# Patient Record
Sex: FEMALE | Race: WHITE | NOT HISPANIC OR LATINO | Employment: UNEMPLOYED | URBAN - METROPOLITAN AREA
[De-identification: names, ages, dates, MRNs, and addresses within clinical notes are randomized per-mention and may not be internally consistent; named-entity substitution may affect disease eponyms.]

---

## 2020-08-03 ENCOUNTER — HOSPITAL ENCOUNTER (OUTPATIENT)
Dept: PREADMISSION TESTING | Facility: HOSPITAL | Age: 20
Discharge: HOME | End: 2020-08-03
Attending: PODIATRIST

## 2020-08-03 ENCOUNTER — OFFICE VISIT CONVERTED (OUTPATIENT)
Dept: PODIATRY | Facility: CLINIC | Age: 20
End: 2020-08-03
Attending: PODIATRIST

## 2020-08-04 LAB — SARS-COV-2 RNA SPEC QL NAA+PROBE: NOT DETECTED

## 2020-08-07 ENCOUNTER — HOSPITAL ENCOUNTER (OUTPATIENT)
Dept: PERIOP | Facility: HOSPITAL | Age: 20
Setting detail: HOSPITAL OUTPATIENT SURGERY
Discharge: HOME | End: 2020-08-07
Attending: PODIATRIST

## 2020-08-07 LAB — HCG UR QL: NEGATIVE

## 2020-08-12 ENCOUNTER — OFFICE VISIT CONVERTED (OUTPATIENT)
Dept: PODIATRY | Facility: CLINIC | Age: 20
End: 2020-08-12
Attending: PODIATRIST

## 2020-08-19 ENCOUNTER — OFFICE VISIT CONVERTED (OUTPATIENT)
Dept: PODIATRY | Facility: CLINIC | Age: 20
End: 2020-08-19
Attending: PODIATRIST

## 2020-08-26 ENCOUNTER — OFFICE VISIT CONVERTED (OUTPATIENT)
Dept: PODIATRY | Facility: CLINIC | Age: 20
End: 2020-08-26
Attending: PODIATRIST

## 2020-09-10 ENCOUNTER — OFFICE VISIT CONVERTED (OUTPATIENT)
Dept: PODIATRY | Facility: CLINIC | Age: 20
End: 2020-09-10
Attending: PODIATRIST

## 2020-10-06 ENCOUNTER — CONVERSION ENCOUNTER (OUTPATIENT)
Dept: PODIATRY | Facility: CLINIC | Age: 20
End: 2020-10-06

## 2020-10-06 ENCOUNTER — OFFICE VISIT CONVERTED (OUTPATIENT)
Dept: PODIATRY | Facility: CLINIC | Age: 20
End: 2020-10-06
Attending: PODIATRIST

## 2021-05-10 NOTE — H&P
History and Physical      Patient Name: Concepcion Villarreal   Patient ID: [id number]   Sex: Female   YOB: 2000    Primary Care Provider: No PCP No PCP Other   Referring Provider: Ivan Marcano DPM    Visit Date: August 3, 2020    Provider: Ivan Marcano DPM   Location: [facility]   Location Address: [address] [place] [address]  [id number]   Location Phone: [phone]          Chief Complaint  · Right Foot Pain  · Surgical History and Physical      History Of Present Illness  Concepcion Villarreal is a 19 year old /White female who presents to the [facility] today new patient referred from [facility] emergency room.   Patient Name: Concepcion Villarreal   Allergies: NO KNOWN DRUG ALLERGIES   Cheif Complaint/HPI: Fractured right fifth metatarsal shaft   Current Medicaitons: MEDICATION LIST   Past Medical History: Foot pain, Fracture of foot, and Hypertension   Relevent Social History: None   Tobacco Use: Does not smoke   Psychological History: Within Normal Limits   HPI     New, Established, New Problem: New  Location: Right fifth metatarsal shaft  Duration:  6 days ago  Onset: Trauma, twisted her foot  Nature: Sore achy  Stable, worsening, improving: Stable, no improvement  Aggravating factors:   Any weightbearing  Previous Treatment: X-rays posterior splint cast, crutches    Recommend ORIF of right fifth metatarsal shaft.  Upon discussion of non-surgical conservative option, surgical correction, post-operative requirements along with risk and benefits of the surgery along with expected outcomes, the patient states they would like to proceed with the scheduling surgery.    Patient denies any fevers, chills, nausea, vomiting, shortness of breathe, nor any other constitutional signs nor symptoms.       Past Medical History  Foot pain; Fracture of foot; Hypertension         Past Surgical History  *Denies any surgical procedures  "        Allergy List  NO KNOWN DRUG ALLERGIES       Allergies Reconciled  Family Medical History  *No Known Family History         Social History  Alcohol (Never); Tobacco (Current every day)         Review of Systems  · Constitutional  o Denies  o : fatigue, night sweats  · Eyes  o Denies  o : double vision, blurred vision  · HENT  o Denies  o : vertigo, recent head injury  · Cardiovascular  o Denies  o : chest pain, irregular heart beats  · Respiratory  o Denies  o : shortness of breath, productive cough  · Gastrointestinal  o Denies  o : nausea, vomiting  · Genitourinary  o Denies  o : dysuria, urinary retention  · Integument  o * See HPI  · Neurologic  o Denies  o : altered mental status, seizures  · Musculoskeletal  o Denies  o : joint swelling, limitation of motion  · Endocrine  o Denies  o : cold intolerance, heat intolerance  · Heme-Lymph  o Denies  o : petechiae, lymph node enlargement or tenderness  · Allergic-Immunologic  o Denies  o : frequent illnesses      Vitals  Date Time BP Position Site L\R Cuff Size HR RR TEMP (F) WT  HT  BMI kg/m2 BSA m2 O2 Sat HC       08/03/2020 01:23 /94 Sitting    108 - R  97.5 280lbs 0oz 5'  8\" 42.57 2.47 97 %    08/03/2020 01:23 /87 Sitting                     Physical Examination  · Constitutional  o Appearance  o : morbidly obese, well developed  · Cardiovascular  o Peripheral Vascular System  o :   § Pedal Pulses  § : pulses 2 + and symmetrical  § Extremities  § : no edema in lower extremities  · Musculoskeletal  o General  o :   § General Musculoskeletal  § : Lower extremity muscle and strength and range of motion is equal and symmetrical bilaterally. The knees are noted to be normal in alignment. Ankle alignment and range of motion is notmral and foot structure is normal. Subtalar, metatarsal and metatarsal-phalangeal range of motion is noted to be within normal limits. The digits of both feet are in normal alignment. The gait is normal. Tenderness to " palpation of the base of the fifth metatarsal shaft on the right foot. Local edema with ecchymosis is seen. No breaks in skin.  · Skin and Subcutaneous Tissue  o General Inspection  o : Skin is noted to have normal texture and turgor, with no excrescences noted.   o Digits and Nails  o : The toenails are noted to be without disese.  · Neurologic  o Sensation  o : Epicritic sensations intact bilaterally.     Dr. Marcano reviewed radiographs and results from [facility] and discussed them with the patient.  These are significant for displaced fracture of the base of the right fifth metatarsal shaft.           Assessment  · Foot pain, right     729.5/M79.671  · Fractured metatarsal bone     825.25/S92.309A  · Preoperative examination     V72.84/Z01.818      Plan  · Orders  o OH Report (KASPR) - - 08/03/2020  o Insertion of internal fixation device into right metatarsal, open approach (38173) - - 08/03/2020  o Application of short leg cast (04326) - - 08/03/2020  o [facility] Pre-Op Covid-19 Screening (97417) - - 08/03/2020  · Medications  o Medications have been Reconciled  o Transition of Care or Provider Policy  · Instructions  o PLAN: ORIF of right fifth metatarsal shaft  o Handouts Provided-Pre-Procedure Instructions including date and time and location of procedure.  o Surgical Facility: [facility]  o ****Surgical Orders****  o ****Patient Status****  o Outpatient  o ********************  o RISK AND BENEFITS:  o Consent for surgery: Given these options, the patient has verbally expressed an understanding of the risks of surgery and finds these risks acceptable. We will proceed with surgery as soon as possible.  o Consult Anesthesia for an post-operative block, or any pain management procedure deemed necessary by the anestesiologist for adequate post-operative pain control.   o O.R. PREP: Per protocol  o IV: Per Anesthesia  o IV: LR@ 75ml/hr  o PLEASE SIGN PERMIT FOR: Fraction of fracture  and right foot.  o *******************************************  o PRE- OP MEDICATION ORDER:  o *******************************************  o *__Kefzol 2 gram IV on call to OR.  o *___The above History and Physical Examination has been completed within 30 days of admission.  o Pre-Admission Testing Date:   o Follow up post surgery in 4 days.  o Local with MAC, supine, [place] 28 hardware set.  o Electronically Identified Patient Education Materials Provided Electronically  · Disposition  o Call or Return if symptoms worsen or persist.            Electronically Signed by: Ivan Marcano DPM -Author on August 3, 2020 01:45:27 PM

## 2021-05-13 NOTE — PROGRESS NOTES
Progress Note      Patient Name: Concepcion Villarreal   Patient ID: [id number]   Sex: Female   YOB: 2000    Primary Care Provider: No PCP No PCP Other   Referring Provider: Ivan Marcano DPM    Visit Date: August 12, 2020    Provider: Ivan Marcano DPM   Location: [facility]   Location Address: [address] [place] [address]  [id number]   Location Phone: [phone]          Chief Complaint  · Follow Up Office Visit S/P Surgery      History Of Present Illness  Concepcion Villarreal is a 19 year old /White female who presents today for a postoperative visit.      Procedure: Right fifth metatarsal ORIF fracture  Date: 7 August 2020    Patient states they are doing well without complications.  Patient states they are following post-op instructions.  Patient states pain is controlled.      Patient denies any fevers, chills, nausea, vomiting, shortness of breathe, nor any other constitutional signs nor symptoms.                 Past Medical History  Foot pain; Fracture of foot; Hypertension         Past Surgical History  *Denies any surgical procedures         Allergy List  NO KNOWN DRUG ALLERGIES         Family Medical History  *No Known Family History         Social History  Alcohol (Never); Tobacco (Current every day)         Review of Systems  · Constitutional  o Denies  o : fatigue, night sweats  · Eyes  o Denies  o : double vision, blurred vision  · HENT  o Denies  o : vertigo, recent head injury  · Cardiovascular  o Denies  o : chest pain, irregular heart beats  · Respiratory  o Denies  o : shortness of breath, productive cough  · Gastrointestinal  o Denies  o : nausea, vomiting  · Genitourinary  o Denies  o : dysuria, urinary retention  · Integument  o * See HPI  · Neurologic  o Denies  o : altered mental status, seizures  · Musculoskeletal  o * See HPI  · Endocrine  o Denies  o : cold intolerance, heat intolerance  · Heme-Lymph  o Denies  o :  petechiae, lymph node enlargement or tenderness  · Allergic-Immunologic  o Denies  o : frequent illnesses      Physical Examination  · Constitutional  o Appearance  o : well developed, well-nourished, no obvious deformities present  · Cardiovascular  o Peripheral Vascular System  o :   § Pedal Pulses  § : pulses 2 + and symmetrical  § Extremities  § : no edema in lower extremities  · Skin and Subcutaneous Tissue  o General Inspection  o : Skin is noted to have normal texture and turgor, with no excrescences noted.   o Digits and Nails  o : The toenails are noted to be without disese.  · Neurologic  o Sensation  o : Epicritic sensations intact bilaterally.  · Right Ankle/Foot  o Inspection  o : Posterior splint cast and dressing are dry and intact without signs of breakthrough. Sutures intact with skin edges well-coapted with no signs of dehiscence. Healthy surgical skin edges. No drainage present. No edema, erythema, calor, lymphangitis, nor signs of infection seen.  · In Office Procedures  o View  o : AP/LATERAL/OBLIQUE   o Site  o : right, foot   o Indication  o : Right foot postop  o Study  o : X-rays ordered, taken in the office, and reviewed today.  o Xray  o : ORIF in original postoperative position with good reduction of the fracture fragment the base of the right fifth metatarsal shaft.  o Comparative Data  o : No other changes seen compared to previous views.   · Casting  o Extremity  o : right leg, Short leg cast.    o Procedure  o : Patient was placed in fiberglass cast today. The patient tolerated the procedure without any complications., Neurovascular status was evaluated post cast application and was within normal limits. The cast was not causing impingement on neuro-vasculature or vital structures.          Assessment  · Postoperative Exam Following Surgery     V67.00      Plan  · Orders  o Casting Supplies (Short Leg) Adult () - - 08/12/2020  o Xray foot right [facility] Preferred View (15229-[id number]) - -  08/12/2020  o Application of short leg cast (85831) - - 08/12/2020  · Instructions  o Follow up in 2 weeks. X-rays, second cast application  o Patient tending nonweightbearing and the cast dry and intact. The patient states understanding and agreement with this plan.   o Electronically Identified Patient Education Materials Provided Electronically  · Disposition  o Call or Return if symptoms worsen or persist.            Electronically Signed by: Ivan Marcano DPM -Author on August 12, 2020 08:50:09 AM

## 2021-05-13 NOTE — PROGRESS NOTES
Progress Note      Patient Name: Concepcion Villarreal   Patient ID: [id number]   Sex: Female   YOB: 2000    Primary Care Provider: No PCP No PCP Other   Referring Provider: Ivan Marcano DPM    Visit Date: September 10, 2020    Provider: Ivan Marcano DPM   Location: [facility]   Location Address: [address] [place] [address]  [id number]   Location Phone: [phone]          Chief Complaint  · Follow Up Office Visit S/P Surgery      History Of Present Illness  Concepcion Villarreal is a 20 year old /White female who presents today for a postoperative visit.      Procedure: Right fifth metatarsal ORIF fracture  Date: 7 August 2020    Patient relates no medical changes since their last visit.    Patient denies any fevers, chills, nausea, vomiting, shortness of breathe, nor any other constitutional signs nor symptoms.      The patient states she has been compliant with nonweightbearing to the right foot.               Past Medical History  Aftercare following surgery; Foot pain; Foot pain, right; Fracture of foot; Hypertension         Past Surgical History  *Denies any surgical procedures         Allergy List  NO KNOWN DRUG ALLERGIES       Allergies Reconciled  Family Medical History  *No Known Family History         Social History  Alcohol (Never); Tobacco (Current every day)         Review of Systems  · Constitutional  o Denies  o : fatigue, night sweats  · Eyes  o Denies  o : double vision, blurred vision  · HENT  o Denies  o : vertigo, recent head injury  · Cardiovascular  o Denies  o : chest pain, irregular heart beats  · Respiratory  o Denies  o : shortness of breath, productive cough  · Gastrointestinal  o Denies  o : nausea, vomiting  · Genitourinary  o Denies  o : dysuria, urinary retention  · Integument  o * See HPI  · Neurologic  o Denies  o : altered mental status, seizures  · Musculoskeletal  o * See HPI  · Endocrine  o Denies  o : cold intolerance, heat  intolerance  · Heme-Lymph  o Denies  o : petechiae, lymph node enlargement or tenderness  · Allergic-Immunologic  o Denies  o : frequent illnesses      Physical Examination  · Constitutional  o Appearance  o : well developed, well-nourished, no obvious deformities present  · Cardiovascular  o Peripheral Vascular System  o :   § Pedal Pulses  § : pulses 2 + and symmetrical  § Extremities  § : no edema in lower extremities  · Skin and Subcutaneous Tissue  o General Inspection  o : Skin is noted to have normal texture and turgor, with no excrescences noted.   o Digits and Nails  o : The toenails are noted to be without disese.  · Neurologic  o Sensation  o : Epicritic sensations intact bilaterally.  · Right Ankle/Foot  o Inspection  o : Below-knee fiberglass cast is intact with the dressing and cast padding are dry and intact. Skin edges well-coapted with no signs of dehiscence. No hypertrophic scar formation.  · In Office Procedures  o View  o : AP/LATERAL/OBLIQUE   o Site  o : right, foot   o Indication  o : Right foot postop  o Study  o : X-rays ordered, taken in the office, and reviewed today.  o Xray  o : ORIF in original postoperative position with good reduction of the fracture fragment the base of the right fifth metatarsal shaft. Early trabeculation seen across fracture site.  o Comparative Data  o : No other changes seen compared to previous views.   · Casting  o Extremity  o : right leg, Short leg cast.    o Procedure  o : Patient was placed in fiberglass cast today. The patient tolerated the procedure without any complications., Neurovascular status was evaluated post cast application and was within normal limits. The cast was not causing impingement on neuro-vasculature or vital structures.          Assessment  · Postoperative Exam Following Surgery     V67.00      Plan  · Orders  o Casting Supplies (Short Leg) Adult () - - 09/10/2020  o Xray foot right [facility] Preferred View ([id number]) - -  09/10/2020  o Application of short leg cast (88790) - - 09/10/2020  · Medications  o Medications have been Reconciled  o Transition of Care or Provider Policy  · Instructions  o Follow up in 2 weeks. X-ray removal; begin PWB  o Patient tending nonweightbearing and the cast dry and intact. The patient states understanding and agreement with this plan.   o Electronically Identified Patient Education Materials Provided Electronically  · Disposition  o Call or Return if symptoms worsen or persist.            Electronically Signed by: Ivan Marcano DPM -Author on September 10, 2020 08:37:08 AM

## 2021-05-13 NOTE — PROGRESS NOTES
Progress Note      Patient Name: Concepcion Villarreal   Patient ID: [id number]   Sex: Female   YOB: 2000    Primary Care Provider: No PCP No PCP Other   Referring Provider: Ivan Marcano DPM    Visit Date: August 26, 2020    Provider: Ivan Marcano DPM   Location: [facility]   Location Address: [address] [place] [address]  [id number]   Location Phone: [phone]          Chief Complaint  · Follow Up Office Visit S/P Surgery      History Of Present Illness  Concepcion Villarreal is a 19 year old /White female who presents today for a postoperative visit.      Procedure: Right fifth metatarsal ORIF fracture  Date: 7 August 2020    Patient relates no medical changes since their last visit.    Patient denies any fevers, chills, nausea, vomiting, shortness of breathe, nor any other constitutional signs nor symptoms.      The patient states she has been compliant with nonweightbearing to the right foot.               Past Medical History  Aftercare following surgery; Foot pain; Fracture of foot; Hypertension         Past Surgical History  *Denies any surgical procedures         Allergy List  NO KNOWN DRUG ALLERGIES       Allergies Reconciled  Family Medical History  *No Known Family History         Social History  Alcohol (Never); Tobacco (Current every day)         Review of Systems  · Constitutional  o Denies  o : fatigue, night sweats  · Eyes  o Denies  o : double vision, blurred vision  · HENT  o Denies  o : vertigo, recent head injury  · Cardiovascular  o Denies  o : chest pain, irregular heart beats  · Respiratory  o Denies  o : shortness of breath, productive cough  · Gastrointestinal  o Denies  o : nausea, vomiting  · Genitourinary  o Denies  o : dysuria, urinary retention  · Integument  o * See HPI  · Neurologic  o Denies  o : altered mental status, seizures  · Musculoskeletal  o * See HPI  · Endocrine  o Denies  o : cold intolerance, heat  "intolerance  · Heme-Lymph  o Denies  o : petechiae, lymph node enlargement or tenderness  · Allergic-Immunologic  o Denies  o : frequent illnesses      Vitals  Date Time BP Position Site L\R Cuff Size HR RR TEMP (F) WT  HT  BMI kg/m2 BSA m2 O2 Sat HC       08/26/2020 09:55 /86 Sitting    96 - R  97.5  5'  8\"   97 %    08/26/2020 09:55 /97 Sitting                     Physical Examination  · Constitutional  o Appearance  o : well developed, well-nourished, no obvious deformities present  · Cardiovascular  o Peripheral Vascular System  o :   § Pedal Pulses  § : pulses 2 + and symmetrical  § Extremities  § : no edema in lower extremities  · Skin and Subcutaneous Tissue  o General Inspection  o : Skin is noted to have normal texture and turgor, with no excrescences noted.   o Digits and Nails  o : The toenails are noted to be without disese.  · Neurologic  o Sensation  o : Epicritic sensations intact bilaterally.  · Right Ankle/Foot  o Inspection  o : Below-knee fiberglass cast is intact with the dressing and cast padding are dry and intact. Sutures intact with skin edges well-coapted with no signs of dehiscence. Healthy surgical skin edges. No drainage present. No edema, erythema, calor, lymphangitis, nor signs of infection seen. Upon removal of sutures, skin edges remain well-coapted with no signs of dehiscence.  · In Office Procedures  o View  o : AP/LATERAL/OBLIQUE   o Site  o : right, foot   o Indication  o : Right foot postop  o Study  o : X-rays ordered, taken in the office, and reviewed today.  o Xray  o : ORIF in original postoperative position with good reduction of the fracture fragment the base of the right fifth metatarsal shaft. Early trabeculation seen across fracture site.  o Comparative Data  o : No other changes seen compared to previous views.   · Casting  o Extremity  o : right leg, Short leg cast.    o Procedure  o : Patient was placed in fiberglass cast today. The patient tolerated the " procedure without any complications., Neurovascular status was evaluated post cast application and was within normal limits. The cast was not causing impingement on neuro-vasculature or vital structures.          Assessment  · Postoperative Exam Following Surgery     V67.00      Plan  · Orders  o Casting Supplies (Short Leg) Adult () - - 08/26/2020  o Xray foot right [facility] Preferred View ([id number]) - - 08/26/2020  o Application of short leg cast (72986) - - 08/26/2020  · Medications  o Medications have been Reconciled  o Transition of Care or Provider Policy  · Instructions  o Follow up in 2 weeks. X-rays, fourth of four cast applications  o Patient tending nonweightbearing and the cast dry and intact. The patient states understanding and agreement with this plan.   o Electronically Identified Patient Education Materials Provided Electronically  · Disposition  o Call or Return if symptoms worsen or persist.            Electronically Signed by: Ivan Marcano DPM -Author on August 26, 2020 10:11:34 AM

## 2021-05-13 NOTE — PROGRESS NOTES
Progress Note      Patient Name: Concepcion Villarreal   Patient ID: [id number]   Sex: Female   YOB: 2000    Primary Care Provider: No PCP No PCP Other   Referring Provider: Ivan Marcano DPM    Visit Date: October 6, 2020    Provider: Ivan Marcano DPM   Location: [facility]   Location Address: [address] [place] [address]  [id number]   Location Phone: [phone]          Chief Complaint  · Follow Up Office Visit S/P Surgery      History Of Present Illness  Concepcion Villarreal is a 20 year old /White female who presents today for a postoperative visit.      Procedure: Right fifth metatarsal ORIF fracture  Date: 7 August 2020    Patient relates no medical changes since their last visit.    Patient denies any fevers, chills, nausea, vomiting, shortness of breathe, nor any other constitutional signs nor symptoms.    The patient states she took her cast off her right foot last week and returned to regular shoes at that time.  She states she is having no difficulty walking in her regular shoes.       Review of Systems  · Constitutional  o Denies  o : fatigue, night sweats  · Eyes  o Denies  o : double vision, blurred vision  · HENT  o Denies  o : vertigo, recent head injury  · Cardiovascular  o Denies  o : chest pain, irregular heart beats  · Respiratory  o Denies  o : shortness of breath, productive cough  · Gastrointestinal  o Denies  o : nausea, vomiting  · Genitourinary  o Denies  o : dysuria, urinary retention  · Integument  o * See HPI  · Neurologic  o Denies  o : altered mental status, seizures  · Musculoskeletal  o * See HPI  · Endocrine  o Denies  o : cold intolerance, heat intolerance  · Heme-Lymph  o Denies  o : petechiae, lymph node enlargement or tenderness  · Allergic-Immunologic  o Denies  o : frequent illnesses      Vitals  Date Time BP Position Site L\R Cuff Size HR RR TEMP (F) WT  HT  BMI kg/m2 BSA m2 O2 Sat FR L/min FiO2 HC       10/06/2020 10:17 AM  "148/72 Sitting    102 - R  98 281lbs 2oz 5'  8\" 42.74 2.47 98 %            Physical Examination  · Constitutional  o Appearance  o : overweight, well developed  · Cardiovascular  o Peripheral Vascular System  o :   § Pedal Pulses  § : pulses 2 + and symmetrical  § Extremities  § : no edema in lower extremities  · Skin and Subcutaneous Tissue  o General Inspection  o : Skin is noted to have normal texture and turgor, with no excrescences noted.   o Digits and Nails  o : The toenails are noted to be without disese.  · Neurologic  o Sensation  o : Epicritic sensations intact bilaterally.  · Right Ankle/Foot  o Inspection  o : Skin edges well-coapted with no signs of dehiscence. No hypertrophic scar formation.  · In Office Procedures  o View  o : AP/LATERAL/OBLIQUE   o Site  o : right, foot   o Indication  o : Right foot postop  o Study  o : X-rays ordered, taken in the office, and reviewed today.  o Xray  o : Radiographs show proximal 1st metatarsal shaft osteotomy which shows a bunionectomy with good post-operative position with screw fixation x 2. Increase and improvement in trabeculation seen across osteotomy site. No osteolytic changes seen surrounding screw placement. No periosteal reactions nor osteolytic lesions seen. No occult fracture seen.   o Comparative Data  o : No other changes seen compared to previous views.      Pt ambulating without difficulty in her regular shoes b/l.           Assessment  · Postoperative Exam Following Surgery     V67.00  · Compliance poor     V15.81/Z91.19      Plan  · Orders  o Xray foot right [facility]erred View ([id number]) - - 10/06/2020  · Medications  o Medications have been Reconciled  o Transition of Care or Provider Policy  · Instructions  o The patient is discharged from this surgery. Patient may begin to weight bear as tolerated in supportive shoes. No impact activities for one month. After that time, the patient may increase activities as tolerated. Patient states " understanding and agreement with this plan.  o Electronically Identified Patient Education Materials Provided Electronically  · Disposition  o Call or Return if symptoms worsen or persist.            Electronically Signed by: Ivan Marcano DPM -Author on October 6, 2020 10:29:06 AM

## 2021-05-13 NOTE — PROGRESS NOTES
Progress Note      Patient Name: Concepcion Villarreal   Patient ID: [id number]   Sex: Female   YOB: 2000    Primary Care Provider: No PCP No PCP Other   Referring Provider: Ivan Marcano DPM    Visit Date: August 19, 2020    Provider: Ivan Marcano DPM   Location: [facility]   Location Address: [address] [place] [address]  [id number]   Location Phone: [phone]          Chief Complaint  · Follow Up Office Visit S/P Surgery      History Of Present Illness  Concepcion Villarreal is a 19 year old /White female who presents today for a postoperative visit.      Procedure: Right fifth metatarsal ORIF fracture  Date: 7 August 2020    Patient states she got her cast wet despite trying to keep it dry while taking a shower.  She was instructed to come in to evaluate the cast and most likely change it out.    Patient denies any fevers, chills, nausea, vomiting, shortness of breathe, nor any other constitutional signs nor symptoms.      Otherwise the patient states she has been compliant with nonweightbearing to the right foot.               Past Medical History  Aftercare following surgery; Foot pain; Fracture of foot; Hypertension         Past Surgical History  *Denies any surgical procedures         Allergy List  NO KNOWN DRUG ALLERGIES       Allergies Reconciled  Family Medical History  *No Known Family History         Social History  Alcohol (Never); Tobacco (Current every day)         Review of Systems  · Constitutional  o Denies  o : fatigue, night sweats  · Eyes  o Denies  o : double vision, blurred vision  · HENT  o Denies  o : vertigo, recent head injury  · Cardiovascular  o Denies  o : chest pain, irregular heart beats  · Respiratory  o Denies  o : shortness of breath, productive cough  · Gastrointestinal  o Denies  o : nausea, vomiting  · Genitourinary  o Denies  o : dysuria, urinary retention  · Integument  o * See HPI  · Neurologic  o Denies  o :  "altered mental status, seizures  · Musculoskeletal  o * See HPI  · Endocrine  o Denies  o : cold intolerance, heat intolerance  · Heme-Lymph  o Denies  o : petechiae, lymph node enlargement or tenderness  · Allergic-Immunologic  o Denies  o : frequent illnesses      Vitals  Date Time BP Position Site L\R Cuff Size HR RR TEMP (F) WT  HT  BMI kg/m2 BSA m2 O2 Sat HC       08/19/2020 02:24 /108 Sitting    123 - R   280lbs 0oz 5'  8\" 42.57 2.47 96 %    08/19/2020 02:24 /96 Sitting                     Physical Examination  · Constitutional  o Appearance  o : well developed, well-nourished, no obvious deformities present  · Cardiovascular  o Peripheral Vascular System  o :   § Pedal Pulses  § : pulses 2 + and symmetrical  § Extremities  § : no edema in lower extremities  · Skin and Subcutaneous Tissue  o General Inspection  o : Skin is noted to have normal texture and turgor, with no excrescences noted.   o Digits and Nails  o : The toenails are noted to be without disese.  · Neurologic  o Sensation  o : Epicritic sensations intact bilaterally.  · Right Ankle/Foot  o Inspection  o : Below-knee fiberglass cast is intact with the dressing and cast padding are wet. Sutures intact with skin edges well-coapted with no signs of dehiscence. Healthy surgical skin edges. No drainage present. No edema, erythema, calor, lymphangitis, nor signs of infection seen.  · Casting  o Extremity  o : right leg, Short leg cast.    o Procedure  o : Patient was placed in fiberglass cast today. The patient tolerated the procedure without any complications., Neurovascular status was evaluated post cast application and was within normal limits. The cast was not causing impingement on neuro-vasculature or vital structures.          Assessment  · Postoperative Exam Following Surgery     V67.00      Plan  · Orders  o Casting Supplies (Short Leg) Adult () - - 08/19/2020  o Application of short leg cast (63129) - - " 08/19/2020  · Medications  o Medications have been Reconciled  o Transition of Care or Provider Policy  · Instructions  o Follow up in 1 weeks. X-rays, third of four cast applications  o Patient tending nonweightbearing and the cast dry and intact. The patient states understanding and agreement with this plan.   o Electronically Identified Patient Education Materials Provided Electronically  · Disposition  o Call or Return if symptoms worsen or persist.            Electronically Signed by: Ivan Mracano DPM -Author on August 19, 2020 02:44:12 PM

## 2021-05-14 VITALS
TEMPERATURE: 97.1 F | HEIGHT: 68 IN | OXYGEN SATURATION: 99 % | DIASTOLIC BLOOD PRESSURE: 71 MMHG | SYSTOLIC BLOOD PRESSURE: 140 MMHG | HEART RATE: 102 BPM

## 2021-05-14 VITALS
HEART RATE: 102 BPM | TEMPERATURE: 98 F | OXYGEN SATURATION: 98 % | DIASTOLIC BLOOD PRESSURE: 72 MMHG | BODY MASS INDEX: 42.6 KG/M2 | HEIGHT: 68 IN | WEIGHT: 281.12 LBS | SYSTOLIC BLOOD PRESSURE: 148 MMHG

## 2021-05-14 VITALS
HEART RATE: 96 BPM | OXYGEN SATURATION: 97 % | HEIGHT: 68 IN | DIASTOLIC BLOOD PRESSURE: 86 MMHG | TEMPERATURE: 97.5 F | SYSTOLIC BLOOD PRESSURE: 182 MMHG

## 2021-05-14 VITALS
HEIGHT: 68 IN | HEART RATE: 123 BPM | SYSTOLIC BLOOD PRESSURE: 182 MMHG | BODY MASS INDEX: 42.44 KG/M2 | OXYGEN SATURATION: 96 % | WEIGHT: 280 LBS | DIASTOLIC BLOOD PRESSURE: 108 MMHG

## 2021-05-15 VITALS
HEIGHT: 68 IN | BODY MASS INDEX: 42.44 KG/M2 | OXYGEN SATURATION: 97 % | WEIGHT: 280 LBS | SYSTOLIC BLOOD PRESSURE: 160 MMHG | DIASTOLIC BLOOD PRESSURE: 94 MMHG | TEMPERATURE: 97.5 F | HEART RATE: 108 BPM

## 2021-05-15 VITALS
HEART RATE: 117 BPM | WEIGHT: 280 LBS | BODY MASS INDEX: 42.44 KG/M2 | HEIGHT: 68 IN | SYSTOLIC BLOOD PRESSURE: 193 MMHG | DIASTOLIC BLOOD PRESSURE: 93 MMHG | OXYGEN SATURATION: 98 % | TEMPERATURE: 98 F

## 2021-11-09 PROCEDURE — U0004 COV-19 TEST NON-CDC HGH THRU: HCPCS | Performed by: EMERGENCY MEDICINE

## 2022-02-03 PROBLEM — I10 HYPERTENSION: Status: ACTIVE | Noted: 2022-02-03

## 2022-02-03 PROBLEM — Z48.89 AFTERCARE FOLLOWING SURGERY: Status: ACTIVE | Noted: 2020-08-12

## 2022-02-03 PROBLEM — M79.673 PAIN OF FOOT: Status: ACTIVE | Noted: 2020-09-10

## 2022-02-03 PROBLEM — S92.909A FRACTURE OF FOOT: Status: ACTIVE | Noted: 2022-02-03
